# Patient Record
Sex: FEMALE | URBAN - METROPOLITAN AREA
[De-identification: names, ages, dates, MRNs, and addresses within clinical notes are randomized per-mention and may not be internally consistent; named-entity substitution may affect disease eponyms.]

---

## 2019-01-14 ENCOUNTER — OFFICE VISIT (OUTPATIENT)
Dept: URBAN - METROPOLITAN AREA CLINIC 17 | Facility: CLINIC | Age: 44
End: 2019-01-14
Payer: COMMERCIAL

## 2019-01-14 DIAGNOSIS — H52.223 REGULAR ASTIGMATISM, BILATERAL: Primary | ICD-10-CM

## 2019-01-14 PROCEDURE — 92014 COMPRE OPH EXAM EST PT 1/>: CPT | Performed by: OPTOMETRIST

## 2019-01-14 PROCEDURE — 92015 DETERMINE REFRACTIVE STATE: CPT | Performed by: OPTOMETRIST

## 2019-01-14 ASSESSMENT — VISUAL ACUITY
OD: 20/20
OS: 20/20

## 2019-01-14 ASSESSMENT — KERATOMETRY
OS: 43.13
OD: 43.00

## 2019-01-14 ASSESSMENT — INTRAOCULAR PRESSURE
OD: 17
OS: 14

## 2019-01-14 NOTE — IMPRESSION/PLAN
Impression: Regular astigmatism, bilateral: H52.223. Plan: Discussed diagnosis in detail with patient. New SRx was given today. Discussed CL, pt declined eval for CL, may reschedule later.